# Patient Record
Sex: MALE | Employment: OTHER | URBAN - METROPOLITAN AREA
[De-identification: names, ages, dates, MRNs, and addresses within clinical notes are randomized per-mention and may not be internally consistent; named-entity substitution may affect disease eponyms.]

---

## 2018-10-18 ENCOUNTER — OFFICE VISIT (OUTPATIENT)
Dept: FAMILY MEDICINE CLINIC | Age: 53
End: 2018-10-18

## 2018-10-18 VITALS
TEMPERATURE: 97.9 F | DIASTOLIC BLOOD PRESSURE: 70 MMHG | SYSTOLIC BLOOD PRESSURE: 96 MMHG | WEIGHT: 188.2 LBS | HEART RATE: 100 BPM | RESPIRATION RATE: 18 BRPM | BODY MASS INDEX: 28.52 KG/M2 | HEIGHT: 68 IN

## 2018-10-18 DIAGNOSIS — Z13.220 SCREENING, LIPID: ICD-10-CM

## 2018-10-18 DIAGNOSIS — Z12.5 SCREENING PSA (PROSTATE SPECIFIC ANTIGEN): ICD-10-CM

## 2018-10-18 DIAGNOSIS — Z23 NEEDS FLU SHOT: ICD-10-CM

## 2018-10-18 DIAGNOSIS — Z11.4 ENCOUNTER FOR SCREENING FOR HIV: ICD-10-CM

## 2018-10-18 DIAGNOSIS — Z12.11 SCREEN FOR COLON CANCER: ICD-10-CM

## 2018-10-18 DIAGNOSIS — M06.9 RHEUMATOID ARTHRITIS INVOLVING MULTIPLE SITES, UNSPECIFIED RHEUMATOID FACTOR PRESENCE: Primary | ICD-10-CM

## 2018-10-18 DIAGNOSIS — Z11.59 NEED FOR HEPATITIS C SCREENING TEST: ICD-10-CM

## 2018-10-18 DIAGNOSIS — E03.9 ACQUIRED HYPOTHYROIDISM: ICD-10-CM

## 2018-10-18 PROCEDURE — 90688 IIV4 VACCINE SPLT 0.5 ML IM: CPT | Performed by: FAMILY MEDICINE

## 2018-10-18 PROCEDURE — 90471 IMMUNIZATION ADMIN: CPT | Performed by: FAMILY MEDICINE

## 2018-10-18 PROCEDURE — 99204 OFFICE O/P NEW MOD 45 MIN: CPT | Performed by: FAMILY MEDICINE

## 2018-10-18 RX ORDER — DULOXETIN HYDROCHLORIDE 30 MG/1
30 CAPSULE, DELAYED RELEASE ORAL
COMMUNITY
Start: 2018-09-28 | End: 2018-10-18

## 2018-10-18 RX ORDER — OXYCODONE AND ACETAMINOPHEN 10; 325 MG/1; MG/1
1 TABLET ORAL EVERY 8 HOURS PRN
Qty: 90 TABLET | Refills: 0 | Status: CANCELLED | OUTPATIENT
Start: 2018-10-18

## 2018-10-18 RX ORDER — NAPROXEN 500 MG/1
TABLET ORAL
COMMUNITY
Start: 2018-10-16 | End: 2018-10-18

## 2018-10-18 RX ORDER — MELOXICAM 15 MG/1
TABLET ORAL
Qty: 30 TABLET | Refills: 1 | Status: SHIPPED | OUTPATIENT
Start: 2018-10-18

## 2018-10-18 RX ORDER — GABAPENTIN 300 MG/1
CAPSULE ORAL
COMMUNITY
Start: 2018-10-16

## 2018-10-18 RX ORDER — ALBUTEROL SULFATE 90 UG/1
2 AEROSOL, METERED RESPIRATORY (INHALATION)
COMMUNITY
Start: 2011-08-11

## 2018-10-18 ASSESSMENT — PATIENT HEALTH QUESTIONNAIRE - PHQ9
SUM OF ALL RESPONSES TO PHQ QUESTIONS 1-9: 1
1. LITTLE INTEREST OR PLEASURE IN DOING THINGS: 0
2. FEELING DOWN, DEPRESSED OR HOPELESS: 1
SUM OF ALL RESPONSES TO PHQ9 QUESTIONS 1 & 2: 1
SUM OF ALL RESPONSES TO PHQ QUESTIONS 1-9: 1

## 2018-10-18 ASSESSMENT — ENCOUNTER SYMPTOMS
COLOR CHANGE: 0
BLOOD IN STOOL: 0
SORE THROAT: 0
SHORTNESS OF BREATH: 0
NAUSEA: 0
WHEEZING: 0
VOMITING: 0
VOICE CHANGE: 0
TROUBLE SWALLOWING: 0
RHINORRHEA: 0
DIARRHEA: 0
ABDOMINAL PAIN: 0
SINUS PAIN: 0
CHEST TIGHTNESS: 0
CONSTIPATION: 0
COUGH: 0

## 2018-10-18 NOTE — PROGRESS NOTES
report for additional information. Orders Placed This Encounter   Procedures    [facility]     This test is performed by an external laboratory and is used for result attachment only. Please fill out the appropriate paperwork required by the processing laboratory. See [email or web address] for further information. Standing Status:   Future     Standing Expiration Date:   10/18/2019    INFLUENZA, QUADV, 3 YRS AND OLDER, IM, MDV, 0.5ML (FLUZONE QUADV)    Hepatitis C Antibody     Standing Status:   Future     Standing Expiration Date:   10/18/2019    HIV-1,-2 w/Reflex to HIV-1 Western Blot     Standing Status:   Future     Standing Expiration Date:   10/18/2019    CBC Auto Differential     Standing Status:   Future     Standing Expiration Date:   10/18/2019    Comprehensive Metabolic Panel     Standing Status:   Future     Standing Expiration Date:   10/18/2019    Lipid Panel     Standing Status:   Future     Standing Expiration Date:   10/18/2019     Order Specific Question:   Is Patient Fasting?/# of Hours     Answer:   10-12    Psa screening     Standing Status:   Future     Standing Expiration Date:   10/18/2019    TSH without Reflex     Standing Status:   Future     Standing Expiration Date:   10/18/2019    Amb External Referral To Rheumatology     Referral Priority:   Routine     Referral Type:   Eval and Treat     Referral Reason:   Specialty Services Required     Referred to Provider:   Bebeto Pedroza MD     Requested Specialty:   Rheumatology     Number of Visits Requested:   1     Patient was referred for rheumatology evaluation with Dr. Gaby Otero. Patient is not a candidate for narcotic pain medication. Although, the patient is requesting pain medication which she has been on in the past including oxycodone and Opana ER. Patient does have a history of substance abuse according to his records.     Quality & Risk Score Accuracy    Visit Dx:  M06.9 - Rheumatoid arthritis involving multiple sites,